# Patient Record
Sex: FEMALE | Race: WHITE
[De-identification: names, ages, dates, MRNs, and addresses within clinical notes are randomized per-mention and may not be internally consistent; named-entity substitution may affect disease eponyms.]

---

## 2017-02-06 ENCOUNTER — HOSPITAL ENCOUNTER (OUTPATIENT)
Dept: HOSPITAL 45 - C.MAMM | Age: 57
Discharge: HOME | End: 2017-02-06
Attending: OBSTETRICS & GYNECOLOGY
Payer: COMMERCIAL

## 2017-02-06 DIAGNOSIS — Z12.31: Primary | ICD-10-CM

## 2017-02-06 DIAGNOSIS — R92.0: ICD-10-CM

## 2017-02-06 NOTE — MAMMOGRAPHY REPORT
BILATERAL DIGITAL SCREENING MAMMOGRAM TOMOSYNTHESIS WITH CAD: 2/6/2017

CLINICAL HISTORY: Routine screening.  Patient has no complaints.  





TECHNIQUE:  Breast tomosynthesis in addition to standard 2D mammography was performed. Current study
 was also evaluated with a Computer Aided Detection (CAD) system.  



COMPARISON: Comparison is made to exams dated:  2/4/2016 mammogram, 1/16/2014 mammogram, 1/19/2015 m
ammogram, 12/21/2009 mammogram - [facility], and 12/19/2008.   



BREAST COMPOSITION:  There are scattered areas of fibroglandular density in both breasts.  



FINDINGS:  There is a faint cluster of microcalcifications in the upper outer middle one third of th
e left breast, for which additional spot magnification views are recommended.



No other suspicious mass, architectural distortion or cluster of microcalcifications is seen.  



IMPRESSION:  ACR BI-RADS CATEGORY 0: INCOMPLETE EVALUATION:  NEED ADDITIONAL IMAGING EVALUATION

The faint cluster of microcalcifications in the left upper outer breast need additional evaluation. 
 

The patient will be called to schedule an appointment.  





Approximately 10% of breast cancers are not detected with mammography. A negative mammographic repor
t should not delay biopsy if a clinically suggestive mass is present.



Charlotte Ramírez M.D.          

ay/:2/6/2017 15:35:23  



Imaging Technologist: Barrington ROJAS(KAREL)(M), [facility]

letter sent: Addl Imaging 0  

BI-RADS Code: ACR BI-RADS Category 0: Incomplete Evaluation:  Need Additional Imaging Evaluation

## 2017-02-22 ENCOUNTER — HOSPITAL ENCOUNTER (OUTPATIENT)
Dept: HOSPITAL 45 - C.MAMM | Age: 57
Discharge: HOME | End: 2017-02-22
Attending: OBSTETRICS & GYNECOLOGY
Payer: COMMERCIAL

## 2017-02-22 DIAGNOSIS — R92.0: Primary | ICD-10-CM

## 2017-02-22 NOTE — MAMMOGRAPHY REPORT
UNILATERAL LEFT DIGITAL DIAGNOSTIC MAMMOGRAM: 2/22/2017

CLINICAL HISTORY: 56-year-old woman with a family history of breast cancer called back from screenin
g mammography for a faint cluster of microcalcifications in the upper outer quadrant of the left nam
ast.  





TECHNIQUE:  

COMPARISON: Comparison is made to exams dated:  2/6/2017 mammogram, 2/4/2016 mammogram, 1/19/2015 ma
mmogram - [facility], 12/19/2008, 12/21/2009 mammogram, and 1/16/2014 mammogram - 
[facility].   



BREAST COMPOSITION:  There are scattered areas of fibroglandular density in the left breast.  



FINDINGS:  There is a 3 mm cluster of faint amorphous microcalcifications in the upper outer middle 
one third of the left breast that is indeterminate, warranting further evaluation with a stereotacti
c guided biopsy.  No obvious associated architectural distortion or mass.  No other suspicious clust
ered microcalcifications, focal areas of architectural distortion or mass is seen in the visualized 
left breast.



IMPRESSION:  ACR BI-RADS CATEGORY 4B: INTERMEDIATE SUSPICION FOR MALIGNANCY

1.  A left breast stereotactic guided biopsy is recommended for a faint 3 mm cluster of amorphous mi
crocalcifications in the upper outer middle one third of the breast.



These results and recommendations were discussed with the patient at the time of the exam.  She tent
atively scheduled the biopsy prior to leaving the department.



Approximately 10% of breast cancers are not detected with mammography. A negative mammographic repor
t should not delay biopsy if a clinically suggestive mass is present.



Charlotte Ramírez M.D.          

ay/:2/22/2017 14:21:15  



Imaging Technologist: Lisa ROJAS(KAREL)(ESTELLE), [facility]

letter sent: Abnormal 4/5  

BI-RADS Code: ACR BI-RADS Category 4B: Intermediate Suspicion For Malignancy

## 2017-02-28 ENCOUNTER — HOSPITAL ENCOUNTER (OUTPATIENT)
Dept: HOSPITAL 45 - C.MAMM | Age: 57
Discharge: HOME | End: 2017-02-28
Attending: OBSTETRICS & GYNECOLOGY
Payer: COMMERCIAL

## 2017-02-28 DIAGNOSIS — N60.92: ICD-10-CM

## 2017-02-28 DIAGNOSIS — D24.2: Primary | ICD-10-CM

## 2017-02-28 DIAGNOSIS — R92.0: ICD-10-CM

## 2017-02-28 NOTE — DISCHARGE INSTRUCTIONS
Discharge Instructions


Procedure


Procedure Date:


Feb 28, 2017.


Reason for visit:


Left Calcifications.





Discharge


Discharge Date:


Feb 28, 2017.


Discharge Diagnosis:


post left breast stereotactic guided biopsy





Instructions


Activity Recommendations:  Additional Limitations (see below)


Return to School/Work:  no limitations


Recommended Home Diet:  No Limitations


Provider Instructions:





ACTIVITY RECOMMENDATIONS:





*  No lifting, pushing, pulling or exercising the affected side for three days.








RETURN TO SCHOOL/WORK:





*  You may return to work/school after the procedure, but do not perform any 

strenuous


   activities for 24 to 48 hours.








MEDICATIONS:





*  Tylenol (two 325 mg) every four to six hours if needed for mild pain (if not 

allergic to Tylenol).








DIET:





*  Resume previous diet.








SPECIAL CARE INSTRUCTIONS:





*  Keep biopsy site dry for 24 hours.  May shower after 24 hours, but do not 

soak (bathe)


   incision.





*  May remove Tegaderm (plastic patch) tomorrow AFTER showering.





*  Leave the steri-strips on for one week.  Allow the steri-strips to fall off 

by themselves.  


   If not off after one week, you may remove them.  You may place a Bandaid


   crosswise over the strips, if desired.





*  Apply ice 10 minutes on and 10 minutes off as needed.





*  Wear a bra at bedtime to sleep more comfortably for 2-3 days.





*  Your referring physician should have the results after approximately 5 to 7 

business days.





*  Call for unusual bleeding, fever, drainage, etc or if you have any questions 

call


    [phone] during normal business hours or after hours call Dr Ramírez, 

[phone].








FOLLOW UP VISIT:





Follow-up with Referring Physician as scheduled.





Allergies


Coded Allergies:  


     Sulfa Drugs (Verified  Allergy, Unknown, RASH, 3/18/16)


Winsome Koroma Recommendations:


 


Call your doctor if:


*  Temperature above 101 degrees


*  Pain not relieved by pain medicine ordered


*  There is increased drainage or redness from any incision


*  You have any unanswered questions or concerns.





Your Doctors Instructions noted above were prepared by provider Charlotte Ramírez.


Patient Signature Section:


 Patient Instructions Signature Page








Mali Giles 











Patient (or Guardian) Signature/Date:____________________________________ I 

have read and understand the instructions given to me by my caregivers.








Caregiver/RN/Doctor Signature/Date:____________________________________








The above-named patient and/or guardian has received patient instructions on 

this date.


























+  Original Patient Signature Page (only) stays with chart.  Please make copy 

for patient.

## 2017-02-28 NOTE — MAMMOGRAPHY REPORT
THIS REPORT HAS BEEN AMENDED.  

STEREOTACTIC GUIDED BIOPSY LEFT BREAST: 2/28/2017

CLINICAL HISTORY: Indeterminate cluster of microcalcifications in the upper outer middle one third o
f the left breast.  Patient presents for stereotactic biopsy.  



COMPARISON: Comparison is made to exams dated:  2/22/2017 mammogram, 2/6/2017 mammogram, 2/4/2016 ma
mmogram, 12/21/2009 mammogram, 1/16/2014 mammogram, and 1/19/2015 mammogram - [facility]. 



PATIENT CONSENT: After explaining the risks, benefits and alternatives of the procedure to the patie
nt, informed consent was obtained both verbally and in writing.  Specific risks include: Bleeding, i
nfection, puncture of adjacent structure, nontarget biopsy, metal allergy, sampling error and medica
tion reaction.  



PROCEDURE DESCRIPTION: A time-out was performed and the left breast was confirmed as the site of bio
psy. The patient was placed prone on the stereotactic biopsy table and the breast was placed in CC f
rom above compression.  The clustered micro-calcifications were visualized and are amenable to stere
otactic biopsy.  Then positive and -15 stereo pair images were obtained and targeting was performed
.  The computer coordinates revealed the targeted outside the breast then positioning was changed to
 CC from below compression and repeat imaging was performed.  This demonstrated the microcalcificati
ons and a target within the breast that was concordant with there are location.  The skin of the inf
erior left breast buffered was prepped with Betadine. 1% Lidocaine with and without epinipherine was
 administered as local anesthesia. A small skin incision was made.  Through the incision, the needle
 was inserted to the depth determined by the computer.  6 samples were obtained using a [employer] 
9-gauge vacuum-assisted biopsy device. The specimen radiograph failed to demonstrate the microcalcif
ications.  6 additional samples at the same site were obtained, also failed to demonstrate the micro
calcifications.  Therefore retargeting was performed and 10 additional samples were obtained.  Retar
geting demonstrated nearly the same coordinates as the first site and the same incision site was uti
lized.  These additional 10 samples demonstrate the entire cluster of microcalcifications in questio
n, therefore, a metallic biopsy marker was placed at the site of the biopsy.  There is no immediate 
complication.  Hemostasis was achieved after several minutes of manual compression.  The samples wer
e sent to pathology in 3 containers.  The first 2 containers containing samples obtained prior to re
targeting, without calcifications.  The third container labeled "with calcifications" was obtained a
t the same biopsy site, but 10 minutes later.



Postprocedure CC and ML views of the left breast demonstrate a new dumbbell-shaped metallic biopsy m
arker and no significant hematoma in the upper outer middle one third of the left breast, at the sit
e of the biopsied clustered micro-calcifications in question.  

IMPRESSION: STEREOTACTIC GUIDED BIOPSY

Status post left breast stereotactic guided biopsy of clustered microcalcifications in the upper out
er quadrant, with biopsy marker placed at the site.



The patient will receive notification of the biopsy results from her referring physician.





Charlotte Ramírez M.D.  

ay/:2/28/2017 14:52:27  



Attending Technologist: Julianna ROJAS(R)(M), [facility]

Imaging Technologist: Lisa PENALOZAR)(M), [facility]









AMENDMENT: 3/9/2017   Charlotte Ramírez M.D. 

Pathology results from stereotactic guided biopsy of faint clustered microcalcifications in the left
 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  Associated mi
crocalcifications.  Other samples yielded focal usual ductal hyperplasia and fibrocystic change with
 scattered microcalcifications.  The pathology results are concordant with the imaging appearance.  
Given the finding of ADH, surgical consultation for a surgical excisional biopsy is recommended.

## 2017-02-28 NOTE — MAMMOGRAPHY REPORT
UNILATERAL LEFT DIGITAL DIAGNOSTIC MAMMOGRAM: 2/28/2017

CLINICAL HISTORY: Status post left breast stereotactic biopsy of faint clustered microcalcifications
 in the upper outer middle one third of the breast.  



Please refer to the report from left breast stereotactic biopsy performed at the same time for full 
detail.



IMPRESSION:  POST PROCEDURE IMAGING FOR MARKER PLACEMENT

Please refer to the report from left breast stereotactic biopsy performed at the same time for full 
detail.





Approximately 10% of breast cancers are not detected with mammography. A negative mammographic repor
t should not delay biopsy if a clinically suggestive mass is present.



Charlotte Ramírez M.D.          

ay/:2/28/2017 14:46:31  



Attending Technologist: Julianna Chacon RT(R)(M), [facility]

Imaging Technologist: Lisa Lopez RT(R)(M), [facility]



BI-RADS Code: Post Procedure Imaging For Marker Placement

## 2018-02-12 ENCOUNTER — HOSPITAL ENCOUNTER (OUTPATIENT)
Dept: HOSPITAL 45 - C.MAMM | Age: 58
Discharge: HOME | End: 2018-02-12
Attending: OBSTETRICS & GYNECOLOGY
Payer: COMMERCIAL

## 2018-02-12 DIAGNOSIS — Z12.31: Primary | ICD-10-CM

## 2018-02-12 NOTE — MAMMOGRAPHY REPORT
BILATERAL DIGITAL SCREENING MAMMOGRAM TOMOSYNTHESIS WITH CAD: 2/12/2018

CLINICAL HISTORY: Routine screening.  Patient has no complaints.  





TECHNIQUE:  Breast tomosynthesis in addition to standard 2D mammography was performed. Current study 
was also evaluated with a Computer Aided Detection (CAD) system.  



COMPARISON: Comparison is made to exams dated:  2/28/2017 mammogram, 2/28/2017 stereotactic biopsy, 2
/6/2017 mammogram, 2/4/2016 mammogram, 1/16/2014 mammogram, and 1/19/2015 mammogram - [facility].   



BREAST COMPOSITION:  There are scattered areas of fibroglandular density in both breasts.  



FINDINGS:  There is expected focal architectural distortion in the upper outer middle one third of th
e left breast, at the site of prior surgical excision for a biopsy proven papilloma with atypia.  Fin
al pathology results revealed no residual papilloma or evidence of DCIS.  There is a stable oval circ
umscribed mass in the 12:00 right breast.  No new suspicious mass, architectural distortion or cluste
r of new, suspicious microcalcifications is seen.  



IMPRESSION:  ACR BI-RADS CATEGORY 1: NEGATIVE

There is no mammographic evidence of malignancy. A 1 year screening mammogram is recommended.  The pa
tient will receive written notification of the results.  





Approximately 10% of breast cancers are not detected with mammography. A negative mammographic report
 should not delay biopsy if a clinically suggestive mass is present.



Charlotte Ramírez M.D.          

ay/:2/12/2018 12:46:37  



Imaging Technologist: Julianna ROJAS(R)(ESTELLE), [facility]

letter sent: Normal 1/2  

BI-RADS Code: ACR BI-RADS Category 1: Negative